# Patient Record
Sex: FEMALE | Race: WHITE | NOT HISPANIC OR LATINO
[De-identification: names, ages, dates, MRNs, and addresses within clinical notes are randomized per-mention and may not be internally consistent; named-entity substitution may affect disease eponyms.]

---

## 2017-12-12 ENCOUNTER — APPOINTMENT (OUTPATIENT)
Dept: PODIATRY | Facility: CLINIC | Age: 52
End: 2017-12-12

## 2017-12-12 ENCOUNTER — OUTPATIENT (OUTPATIENT)
Dept: OUTPATIENT SERVICES | Facility: HOSPITAL | Age: 52
LOS: 1 days | Discharge: HOME | End: 2017-12-12

## 2017-12-12 VITALS
BODY MASS INDEX: 33.33 KG/M2 | HEIGHT: 69 IN | HEART RATE: 82 BPM | DIASTOLIC BLOOD PRESSURE: 77 MMHG | SYSTOLIC BLOOD PRESSURE: 134 MMHG | WEIGHT: 225 LBS | TEMPERATURE: 96.4 F

## 2017-12-12 PROBLEM — Z00.00 ENCOUNTER FOR PREVENTIVE HEALTH EXAMINATION: Status: ACTIVE | Noted: 2017-12-12

## 2017-12-20 DIAGNOSIS — E11.40 TYPE 2 DIABETES MELLITUS WITH DIABETIC NEUROPATHY, UNSPECIFIED: ICD-10-CM

## 2017-12-20 DIAGNOSIS — B35.1 TINEA UNGUIUM: ICD-10-CM

## 2017-12-20 DIAGNOSIS — L85.3 XEROSIS CUTIS: ICD-10-CM

## 2018-02-20 ENCOUNTER — APPOINTMENT (OUTPATIENT)
Dept: PODIATRY | Facility: CLINIC | Age: 53
End: 2018-02-20

## 2018-04-24 ENCOUNTER — APPOINTMENT (OUTPATIENT)
Dept: PODIATRY | Facility: CLINIC | Age: 53
End: 2018-04-24

## 2018-05-16 ENCOUNTER — EMERGENCY (EMERGENCY)
Facility: HOSPITAL | Age: 53
LOS: 1 days | Discharge: LEFT BEFORE DISCHARGE | End: 2018-05-16

## 2018-05-16 VITALS
SYSTOLIC BLOOD PRESSURE: 135 MMHG | DIASTOLIC BLOOD PRESSURE: 63 MMHG | TEMPERATURE: 99 F | OXYGEN SATURATION: 100 % | RESPIRATION RATE: 18 BRPM | HEART RATE: 100 BPM

## 2018-05-16 DIAGNOSIS — Z04.8 ENCOUNTER FOR EXAMINATION AND OBSERVATION FOR OTHER SPECIFIED REASONS: ICD-10-CM

## 2018-05-16 NOTE — ED ADULT NURSE REASSESSMENT NOTE - NS ED NURSE REASSESS COMMENT FT1
Daughter updated myself that pt did not want to tell me the truth about how she has been feeling. Pt has been having hallucinations at home telling her to hurt herself and her daughter.  Charge nurse made aware. 1:1 initiated.

## 2018-05-16 NOTE — ED ADULT TRIAGE NOTE - CHIEF COMPLAINT QUOTE
Pt Blood sugar 29 after taking too much insulin. Pt ate candy and brought BS up to 82 with EMS. A&Ox3, GCS 15.    in Triage.

## 2018-07-08 ENCOUNTER — TRANSCRIPTION ENCOUNTER (OUTPATIENT)
Age: 53
End: 2018-07-08

## 2018-07-16 ENCOUNTER — TRANSCRIPTION ENCOUNTER (OUTPATIENT)
Age: 53
End: 2018-07-16

## 2018-07-19 ENCOUNTER — OUTPATIENT (OUTPATIENT)
Dept: OUTPATIENT SERVICES | Facility: HOSPITAL | Age: 53
LOS: 1 days | Discharge: HOME | End: 2018-07-19

## 2018-07-19 DIAGNOSIS — J18.9 PNEUMONIA, UNSPECIFIED ORGANISM: ICD-10-CM

## 2018-07-19 DIAGNOSIS — R05 COUGH: ICD-10-CM

## 2018-07-26 ENCOUNTER — APPOINTMENT (OUTPATIENT)
Dept: PODIATRY | Facility: CLINIC | Age: 53
End: 2018-07-26

## 2019-01-28 ENCOUNTER — APPOINTMENT (OUTPATIENT)
Dept: PODIATRY | Facility: CLINIC | Age: 54
End: 2019-01-28

## 2019-10-01 ENCOUNTER — OUTPATIENT (OUTPATIENT)
Dept: OUTPATIENT SERVICES | Facility: HOSPITAL | Age: 54
LOS: 1 days | End: 2019-10-01
Payer: MEDICAID

## 2019-10-09 ENCOUNTER — EMERGENCY (EMERGENCY)
Facility: HOSPITAL | Age: 54
LOS: 0 days | Discharge: LEFT BEFORE DISCHARGE | End: 2019-10-09
Attending: EMERGENCY MEDICINE | Admitting: EMERGENCY MEDICINE
Payer: MEDICAID

## 2019-10-09 VITALS — HEART RATE: 74 BPM | RESPIRATION RATE: 16 BRPM | DIASTOLIC BLOOD PRESSURE: 75 MMHG | SYSTOLIC BLOOD PRESSURE: 173 MMHG

## 2019-10-09 VITALS — HEIGHT: 69 IN | WEIGHT: 235.01 LBS

## 2019-10-09 DIAGNOSIS — R20.0 ANESTHESIA OF SKIN: ICD-10-CM

## 2019-10-09 DIAGNOSIS — R20.2 PARESTHESIA OF SKIN: ICD-10-CM

## 2019-10-09 LAB
ALBUMIN SERPL ELPH-MCNC: 4.2 G/DL — SIGNIFICANT CHANGE UP (ref 3.5–5.2)
ALP SERPL-CCNC: 54 U/L — SIGNIFICANT CHANGE UP (ref 30–115)
ALT FLD-CCNC: 23 U/L — SIGNIFICANT CHANGE UP (ref 0–41)
ANION GAP SERPL CALC-SCNC: 10 MMOL/L — SIGNIFICANT CHANGE UP (ref 7–14)
APTT BLD: 34.4 SEC — SIGNIFICANT CHANGE UP (ref 27–39.2)
AST SERPL-CCNC: 16 U/L — SIGNIFICANT CHANGE UP (ref 0–41)
BASOPHILS # BLD AUTO: 0.04 K/UL — SIGNIFICANT CHANGE UP (ref 0–0.2)
BASOPHILS NFR BLD AUTO: 0.7 % — SIGNIFICANT CHANGE UP (ref 0–1)
BILIRUB SERPL-MCNC: 0.3 MG/DL — SIGNIFICANT CHANGE UP (ref 0.2–1.2)
BUN SERPL-MCNC: 22 MG/DL — HIGH (ref 10–20)
CALCIUM SERPL-MCNC: 9.2 MG/DL — SIGNIFICANT CHANGE UP (ref 8.5–10.1)
CHLORIDE SERPL-SCNC: 101 MMOL/L — SIGNIFICANT CHANGE UP (ref 98–110)
CO2 SERPL-SCNC: 27 MMOL/L — SIGNIFICANT CHANGE UP (ref 17–32)
CREAT SERPL-MCNC: 0.6 MG/DL — LOW (ref 0.7–1.5)
EOSINOPHIL # BLD AUTO: 0.1 K/UL — SIGNIFICANT CHANGE UP (ref 0–0.7)
EOSINOPHIL NFR BLD AUTO: 1.7 % — SIGNIFICANT CHANGE UP (ref 0–8)
GLUCOSE SERPL-MCNC: 130 MG/DL — HIGH (ref 70–99)
HCT VFR BLD CALC: 40.4 % — SIGNIFICANT CHANGE UP (ref 37–47)
HGB BLD-MCNC: 12.7 G/DL — SIGNIFICANT CHANGE UP (ref 12–16)
IMM GRANULOCYTES NFR BLD AUTO: 0.2 % — SIGNIFICANT CHANGE UP (ref 0.1–0.3)
INR BLD: 0.9 RATIO — SIGNIFICANT CHANGE UP (ref 0.65–1.3)
LYMPHOCYTES # BLD AUTO: 1.87 K/UL — SIGNIFICANT CHANGE UP (ref 1.2–3.4)
LYMPHOCYTES # BLD AUTO: 32.4 % — SIGNIFICANT CHANGE UP (ref 20.5–51.1)
MCHC RBC-ENTMCNC: 29 PG — SIGNIFICANT CHANGE UP (ref 27–31)
MCHC RBC-ENTMCNC: 31.4 G/DL — LOW (ref 32–37)
MCV RBC AUTO: 92.2 FL — SIGNIFICANT CHANGE UP (ref 81–99)
MONOCYTES # BLD AUTO: 0.4 K/UL — SIGNIFICANT CHANGE UP (ref 0.1–0.6)
MONOCYTES NFR BLD AUTO: 6.9 % — SIGNIFICANT CHANGE UP (ref 1.7–9.3)
NEUTROPHILS # BLD AUTO: 3.36 K/UL — SIGNIFICANT CHANGE UP (ref 1.4–6.5)
NEUTROPHILS NFR BLD AUTO: 58.1 % — SIGNIFICANT CHANGE UP (ref 42.2–75.2)
NRBC # BLD: 0 /100 WBCS — SIGNIFICANT CHANGE UP (ref 0–0)
PLATELET # BLD AUTO: 273 K/UL — SIGNIFICANT CHANGE UP (ref 130–400)
POTASSIUM SERPL-MCNC: 4 MMOL/L — SIGNIFICANT CHANGE UP (ref 3.5–5)
POTASSIUM SERPL-SCNC: 4 MMOL/L — SIGNIFICANT CHANGE UP (ref 3.5–5)
PROT SERPL-MCNC: 7.4 G/DL — SIGNIFICANT CHANGE UP (ref 6–8)
PROTHROM AB SERPL-ACNC: 10.4 SEC — SIGNIFICANT CHANGE UP (ref 9.95–12.87)
RBC # BLD: 4.38 M/UL — SIGNIFICANT CHANGE UP (ref 4.2–5.4)
RBC # FLD: 13.6 % — SIGNIFICANT CHANGE UP (ref 11.5–14.5)
SODIUM SERPL-SCNC: 138 MMOL/L — SIGNIFICANT CHANGE UP (ref 135–146)
WBC # BLD: 5.78 K/UL — SIGNIFICANT CHANGE UP (ref 4.8–10.8)
WBC # FLD AUTO: 5.78 K/UL — SIGNIFICANT CHANGE UP (ref 4.8–10.8)

## 2019-10-09 PROCEDURE — 99284 EMERGENCY DEPT VISIT MOD MDM: CPT

## 2019-10-09 PROCEDURE — 70450 CT HEAD/BRAIN W/O DYE: CPT | Mod: 26

## 2019-10-09 PROCEDURE — 93010 ELECTROCARDIOGRAM REPORT: CPT

## 2019-10-09 RX ORDER — ASPIRIN/CALCIUM CARB/MAGNESIUM 324 MG
325 TABLET ORAL ONCE
Refills: 0 | Status: COMPLETED | OUTPATIENT
Start: 2019-10-09 | End: 2019-10-09

## 2019-10-09 NOTE — ED ADULT TRIAGE NOTE - CHIEF COMPLAINT QUOTE
right arm numbness and right sided facial numbness started at 9AM right arm numbness and right sided facial numbness started at 9AM, pt states she woke up around 7 Am

## 2019-10-09 NOTE — ED PROVIDER NOTE - ATTENDING CONTRIBUTION TO CARE
53 yo F presented to ED for paresthesias of the R side of her body. Concern for CVA vs neuropathy.     Normal neuro exam- equal strength in upper and lower extremities. Sensation intact. Normal gait.     Will get labs, CT scan and reassess.

## 2019-10-09 NOTE — ED PROVIDER NOTE - OBJECTIVE STATEMENT
55yo female with PMHx DM presents c/o R arm and face tingling first noticed at 0900. Patient states she awoke at 0700 and felt ok and then went back to sleep awakening at 0900. She described tingling starting R upper back radiating down to R hand described as if "R arm fell asleep and needing to "shake it out." Denies heaviness or weakness. She states similar sensation was also felt in R foot and R face. R foot resolving. She denies HA, visual changes, or speech difficulty.

## 2019-10-09 NOTE — ED PROVIDER NOTE - PROGRESS NOTE DETAILS
Discussed with neuro, recommended admission as patient is still symptomatic in R hand, however patient does not want to stay. States cannot do a enclosed MRI, prefers open which is only available outpatient. Refuses ASA in ED as well as she states she has retinal hemorrhages in past from NSAIDs. The patient wishes to leave against medical advice.  I have discussed the risks, benefits and alternatives (including the possibility of worsening of disease, pain, permanent disability, and/or death) with the patient and his/her family (if available).  The patient voices understanding of these risks, benefits, and alternatives and still wishes to sign out against medical advice.  The patient is awake, alert, oriented  x 3 and has demonstrated capacity to refuse/direct care.  I have advised the patient that they can and should return immediately should they develop any worse/different/additional symptoms, or if they change their mind and want to continue their care.

## 2019-10-09 NOTE — CONSULT NOTE ADULT - ASSESSMENT
55 yo female with pmh of diabetes, chronic lacunar infarct on CTH presents with acute onset of right sided numbness including face. On exam she has diminished sensation to touch on the right side.      Plan:  MRI brain NC  MRI cerv NC  Carotid duplex  Echo  HA1C, Lipid profile          Neuroattending note will follow

## 2019-10-09 NOTE — ED PROVIDER NOTE - PATIENT PORTAL LINK FT
You can access the FollowMyHealth Patient Portal offered by [facility] by registering at the following website: [email or web address]. By joining [email or web address]’s FollowMyHealth portal, you will also be able to view your health information using other applications (apps) compatible with our system.

## 2019-10-09 NOTE — ED PROVIDER NOTE - CARE PROVIDER_API CALL
Som Castellano)  EEGEpilepsy; Neurology  [address] [place] [address], [address]  [place] [address]  Phone: [phone]  Fax: [phone]  Follow Up Time:

## 2019-10-09 NOTE — ED PROVIDER NOTE - PHYSICAL EXAMINATION
CONST: Well appearing in NAD  EYES: PERRL, EOMI, Sclera and conjunctiva clear.   NECK: Non-tender, no meningeal signs  CARD: Normal S1 S2; Normal rate and rhythm  RESP: Equal BS B/L, No wheezes, rhonchi or rales. No distress  GI: Soft, non-tender, non-distended.  MS: Normal ROM in all extremities. No midline spinal tenderness.  SKIN: Warm, dry, no acute rashes. Good turgor  NEURO: A&Ox3, No focal deficits. Strength 5/5 throughout with no sensory deficits. Steady gait. Romberg/drift negative. Stacy intact.

## 2019-10-09 NOTE — ED ADULT NURSE NOTE - OBJECTIVE STATEMENT
PT presents to ER with new onset of right sided upper and lower extremity numbness starting at 9am today when she woke up, pt a&ox4, speaking coherently, NIH scale 0 at this time. IV inserted. Safety maintained and hourly rounding performed. Will continue with plan of care.

## 2019-10-09 NOTE — ED PROVIDER NOTE - NS ED ROS FT
CONST: No fever, chills or bodyaches  EYES: No pain, redness, drainage or visual changes.  ENT:   CARD: No chest pain, palpitations  RESP: No SOB, cough  GI: No abdominal pain, N/V/D  MS: No joint pain, back pain or extremity pain/injury  SKIN: No rashes  NEURO: see HPI

## 2019-10-09 NOTE — ED ADULT NURSE NOTE - CHIEF COMPLAINT QUOTE
right arm numbness and right sided facial numbness started at 9AM, pt states she woke up around 7 Am

## 2019-10-09 NOTE — CONSULT NOTE ADULT - SUBJECTIVE AND OBJECTIVE BOX
Neurology Consult    Patient is a 54y old  Female who presents with a chief complaint of right sided numbness that she woke up with this morning. She describes her numbness as pins and needles sensation, no associated pain or weakness. She also notes that numbness goes from her right sided neck to her face. She denies any headache, dizziness, weakness or ataxia. NIHSS is 1. CTH shows small lacunar infarct in the left capsulothalamic region.  Patient notes history of back trauma in the past but she did not have MRI. She also notes reaction to IV contrast.          PAST MEDICAL & SURGICAL HISTORY:  Diabetes  :Left eye retinal detachment (diab complication)      FAMILY HISTORY:      Social History: (-) x 3    Allergies    No Known Allergies    Intolerances        MEDICATIONS  (STANDING):  aspirin 325 milliGRAM(s) Oral Once    MEDICATIONS  (PRN):      Review of systems:    Constitutional: No fever, weight loss or fatigue    Eyes: No eye pain or discharge  ENMT:  No difficulty hearing; No sinus or throat pain  Neck: No pain or stiffness  Respiratory: No cough, wheezing, chills or hemoptysis  Cardiovascular: No chest pain, palpitations, shortness of breath, dyspnea on exertion  Gastrointestinal: No abdominal pain, nausea, vomiting or hematemesis; No diarrhea or constipation.   Genitourinary: No dysuria, frequency, hematuria or incontinence  Neurological: As per HPI  Skin: No rashes or lesions   Endocrine: No heat or cold intolerance; No hair loss  Musculoskeletal: No joint pain or swelling  Psychiatric: No depression, anxiety, mood swings  Heme/Lymph: No easy bruising or bleeding gums    Vital Signs Last 24 Hrs  T(C): 36.1 (09 Oct 2019 11:07), Max: 36.1 (09 Oct 2019 10:46)  T(F): 97 (09 Oct 2019 11:07), Max: 97 (09 Oct 2019 11:07)  HR: 74 (09 Oct 2019 14:31) (74 - 85)  BP: 173/75 (09 Oct 2019 14:31) (173/75 - 189/84)  BP(mean): --  RR: 16 (09 Oct 2019 14:31) (16 - 18)  SpO2: 100% (09 Oct 2019 11:07) (100% - 100%)    Neurologic Examination:  General:  Appearance is consistent with chronologic age.  No abnormal facies.   General: The patient is oriented to person, place, time and date.  Recent and remote memory intact.  Fund of knowledge is intact and normal.  Language with normal repetition, comprehension and naming.  Nondysarthric.    Cranial nerves: intact VA, VFF.  EOMI w/o nystagmus, skew or reported double vision.  PERRL.  No ptosis/weakness of eyelid closure.  Facial sensation is normal with normal bite.  No facial asymmetry.  Hearing grossly intact b/l.  Palate elevates midline.  Tongue midline.  Motor examination:   Normal tone, bulk and range of motion.  No tenderness, twitching, tremors or involuntary movements.  Formal Muscle Strength Testing: (MRC grade R/L) 5/5 UE; 5/5 LE.  No observable drift.  Reflexes:   2+ b/l pectoralis, biceps, triceps, brachioradialis, patella and Achilles.  Plantar response downgoing b/l.  Jaw jerk, Hoa, clonus absent.  Sensory examination:   decreased  to light touch on the right, intact to pinprick, pain, temperature and proprioception and vibration in all extremities.  Cerebellum:   FTN/HKS intact with normal YANET in all limbs.  No dysmetria or dysdiadokinesia.     Labs:   CBC Full  -  ( 09 Oct 2019 11:05 )  WBC Count : 5.78 K/uL  RBC Count : 4.38 M/uL  Hemoglobin : 12.7 g/dL  Hematocrit : 40.4 %  Platelet Count - Automated : 273 K/uL  Mean Cell Volume : 92.2 fL  Mean Cell Hemoglobin : 29.0 pg  Mean Cell Hemoglobin Concentration : 31.4 g/dL  Auto Neutrophil # : 3.36 K/uL  Auto Lymphocyte # : 1.87 K/uL  Auto Monocyte # : 0.40 K/uL  Auto Eosinophil # : 0.10 K/uL  Auto Basophil # : 0.04 K/uL  Auto Neutrophil % : 58.1 %  Auto Lymphocyte % : 32.4 %  Auto Monocyte % : 6.9 %  Auto Eosinophil % : 1.7 %  Auto Basophil % : 0.7 %    10-09    138  |  101  |  22<H>  ----------------------------<  130<H>  4.0   |  27  |  0.6<L>    Ca    9.2      09 Oct 2019 11:05    TPro  7.4  /  Alb  4.2  /  TBili  0.3  /  DBili  x   /  AST  16  /  ALT  23  /  AlkPhos  54  10-09    LIVER FUNCTIONS - ( 09 Oct 2019 11:05 )  Alb: 4.2 g/dL / Pro: 7.4 g/dL / ALK PHOS: 54 U/L / ALT: 23 U/L / AST: 16 U/L / GGT: x           PT/INR - ( 09 Oct 2019 11:05 )   PT: 10.40 sec;   INR: 0.90 ratio         PTT - ( 09 Oct 2019 11:05 )  PTT:34.4 sec            Assessment:  This is a 54y Female with h/o     Plan:   -   10-09-19 @ 15:15

## 2019-10-09 NOTE — ED PROVIDER NOTE - CLINICAL SUMMARY MEDICAL DECISION MAKING FREE TEXT BOX
53 yo F presented to ED for paresthesias on the R side of her body. Patient was found to have a lacunar infarct of unknown origin. Seen by neurology. Due to patient remaining symptomatic we wanted to admit her. However, she wants to leave. She understands the risks of leaving AMA including ICH and larger hemorrhage which could cause death. She understands and still wants to leave.   Patient signed out AMA

## 2019-10-10 DIAGNOSIS — Z71.89 OTHER SPECIFIED COUNSELING: ICD-10-CM

## 2019-10-14 DIAGNOSIS — Z76.89 PERSONS ENCOUNTERING HEALTH SERVICES IN OTHER SPECIFIED CIRCUMSTANCES: ICD-10-CM

## 2019-10-16 PROBLEM — E11.9 TYPE 2 DIABETES MELLITUS WITHOUT COMPLICATIONS: Chronic | Status: ACTIVE | Noted: 2019-10-09

## 2019-10-18 ENCOUNTER — APPOINTMENT (OUTPATIENT)
Dept: NEUROLOGY | Facility: CLINIC | Age: 54
End: 2019-10-18

## 2019-11-01 PROCEDURE — G9005: CPT

## 2019-11-25 ENCOUNTER — APPOINTMENT (OUTPATIENT)
Dept: PODIATRY | Facility: CLINIC | Age: 54
End: 2019-11-25

## 2019-12-19 ENCOUNTER — OUTPATIENT (OUTPATIENT)
Dept: OUTPATIENT SERVICES | Facility: HOSPITAL | Age: 54
LOS: 1 days | Discharge: HOME | End: 2019-12-19

## 2019-12-19 ENCOUNTER — APPOINTMENT (OUTPATIENT)
Dept: PODIATRY | Facility: CLINIC | Age: 54
End: 2019-12-19
Payer: MEDICAID

## 2019-12-19 DIAGNOSIS — E11.40 TYPE 2 DIABETES MELLITUS WITH DIABETIC NEUROPATHY, UNSPECIFIED: ICD-10-CM

## 2019-12-19 DIAGNOSIS — E11.42 TYPE 2 DIABETES MELLITUS WITH DIABETIC POLYNEUROPATHY: ICD-10-CM

## 2019-12-19 DIAGNOSIS — L85.3 XEROSIS CUTIS: ICD-10-CM

## 2019-12-19 DIAGNOSIS — B35.1 TINEA UNGUIUM: ICD-10-CM

## 2019-12-19 PROCEDURE — 11721 DEBRIDE NAIL 6 OR MORE: CPT

## 2019-12-19 PROCEDURE — 99213 OFFICE O/P EST LOW 20 MIN: CPT | Mod: 25

## 2019-12-19 RX ORDER — CICLOPIROX OLAMINE 7.7 MG/G
0.77 CREAM TOPICAL TWICE DAILY
Qty: 1 | Refills: 3 | Status: ACTIVE | COMMUNITY
Start: 2017-12-12 | End: 1900-01-01

## 2019-12-27 PROBLEM — E11.42 CONTROLLED DIABETES MELLITUS WITH DIABETIC POLYNEUROPATHY: Status: ACTIVE | Noted: 2019-12-27

## 2019-12-27 PROBLEM — B35.1 DERMATOPHYTOSIS OF NAIL: Status: ACTIVE | Noted: 2017-12-12

## 2019-12-27 PROBLEM — L85.3 DRY SKIN: Status: ACTIVE | Noted: 2017-12-12

## 2019-12-27 PROBLEM — E11.40 DIABETES MELLITUS WITH NEUROPATHY: Status: ACTIVE | Noted: 2017-12-12

## 2019-12-27 NOTE — PHYSICAL EXAM
[Full Pulse] : the pedal pulses are present [Left Foot Was Examined] : The left foot was examined [Right Foot Was Examined] : The right foot was examined [Normal Appearance] : normal in appearance [Normal in Appearance] : normal in appearance [Vibration Dec.] : diminished vibratory sensation at the level of the toes [1+] : 1+ in the dorsalis pedis [#2 Diminished] : number 2 was diminished [#1 Diminished] : number 1 was diminished [#3 Diminished] : number 3 was diminished [Tenderness] : not tender [Erythema] : not erythematous [Full ROM] : with limited range of motion [Swelling] : not swollen [Position Sense Dec.] : normal position sense at the level of the toes [Diminished Throughout Right Foot] : normal tactile sensation with monofilament testing throughout right foot [Diminished Throughout Left Foot] : normal tactile sensation with monofilament testing throughout left foot [FreeTextEntry1] : Thick elongated nails with subungual debris x10

## 2019-12-27 NOTE — ASSESSMENT
[FreeTextEntry1] : Onychomycosis nails x10 \par Debrided nails x 10; carefully removed subungual  debris\par Rx Ciclopirox cream to nails BID\par Diabetic foot patient education\par RTO in 3 months or prn\par

## 2019-12-27 NOTE — HISTORY OF PRESENT ILLNESS
[FreeTextEntry1] : Patient presents with DM2, hx of gestational diabetes and polyneuropathy. Complains of thickened, dystrophic, elongated toe nails for many years and dry skin. Denies any recent trauma to feet, open wounds, fever, chills or malaise.

## 2022-11-03 NOTE — ED ADULT NURSE NOTE - NS ED NURSE LEVEL OF CONSCIOUSNESS ORIENTATION
[FreeTextEntry1] : The visit was provided via telehealth using real-time 2-way audio visual technology. The patient, Apple Parekh, was located at home, in [place], [place] at the time of the visit. The physician, Jonny Delarosa, and genetic counselor, Naomie Carver, were located at their respective medical offices in [place], [place] and [place], [place] at the time of the visit. Genetic counseling , Thor Smith, also participated in this session. Verbal consent for telehealth services was given on 10/27/22 by the patient, Apple Parekh.\par \par REASON FOR CONSULT\par Apple Parekh is a 60-year-old female referred by Dr. Jameson Alston for cancer genetic counseling and a discussion regarding her genetic testing results related to hereditary cancer predisposition. Ms. Parekh was seen via telehealth on 2022 at which time medical and family history was ascertained and a pedigree constructed. \par \par RELEVANT MEDICAL HISTORY\par Ms. Parekh is a healthy individual with no reported history of cancer. She pursued genetic testing on 2022 using [facility] test, ordered by her gynecologist Dr. Ileana Scott, due to her family history of cancer. 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 \par \par HORMONAL HISTORY:\par Obstetrical History: , twins conceived via egg donor\par Age at Menarche: 12\par Menopausal Status: Post-Menopausal with LMP at age 50 \par Age at First Live Birth: 46\par Oral Contraceptive Use: Yes, several decades\par Hormone Replacement Therapy: No\par \par CANCER SCREENING HISTORY:  \par Breast: \par •	Mammography: 22- wnl, BIRADS 2\par •	Sonography: 22- wnl, BIRADS 2\par •	MRI: 22- recommend biopsy for right breast NME\par •	Biopsies: 10/4/220 [facility], preiovus history of calcium deposit after pregnancy\par GYN:\par •	Pelvic Examination: Annual- reported no history of gynecological concerns\par •	Sonography: Patient reported recent TVUS was normal\par •	CA-125: 10/24/22- 5 U/mL\par Colon:\par •	Colonoscopy: 2019- reported a few polyps identified on previous colonoscopies (under 10, pathology unknown), repeat every 5 years\par Skin:  \par •	FBSE: Yes, annual\par •	Lesions biopsied/removed: Yes- basal cell x 3 (forehead, face, back of head)\par \par SOCIAL HISTORY:\par •	Tobacco-product use: Yes, former- socially over 10 years ago\par •	Environmental exposures: No\par \par FAMILY HISTORY:\par Maternal and paternal ancestry was reported as Spanish and O[place]i. Ashkenazi Mosque ancestry was denied. A detailed family history of cancer was ascertained, see below and scanned chart for pedigree. \par \par According to Ms. Parekh no one else in the family has had germline testing for cancer susceptibility. Consanguinity was denied. \par \par RESULTS INTERPRETATION AND ASSESSMENT:\par We informed Ms. 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 \par  \par As part of our discussion, we reviewed the cancer risks associated with the BRIP1 mutation:\par \par OVARIAN CANCER RISK:\par Lifetime risk to develop ovarian cancer is estimated to be approximately 5-15% compared to the general population risk of 1.2%. \par \par BREAST CANCER RISK: \par Currently, it is unclear whether pathogenic mutations in the BRIP1 gene are associated with an increased breast cancer risk. Limited information suggests that there may be a potential increased risk for female breast cancer, including triple negative disease.\par \par In addition, we discussed with Ms. Parekh that she tested positive for a single pathogenic mutation in the MUTYH gene (c.536A>G; p.[id number]). 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 Individuals with a single pathogenic mutation in MUTYH may have a slightly increased risk to develop colorectal cancer compared to the general population risk. Some studies have suggested an increased risk to develop breast cancer as well. However, these studies have not been confirmed in large, pan-ethnic studies, and therefore no changes in screening are recommended at this time. Individuals with bi-allelic (two copies) MUTYH mutations are known to have autosomal recessive MUTYH-associated polyposis (MAP) characterized by adult-onset multiple colorectal adenomas and increased risk for colorectal cancer. Ms. Parekh DOES NOT have MUTYH-associated polyposis as carrying a single MUTYH mutation is not sufficient to cause MAP. \par \par PATIENT MANAGEMENT IMPLICATIONS:\par Given Ms. Parekh’s personal and current reported family history of cancer, and her BRIP1 positive genetic test results, the following screening guidelines and risk-reducing recommendations were discussed:\par \par OVARIAN: \par •	We discussed ovarian cancer screening with transvaginal ultrasound and/or serum  testing has not been shown to reduce ovarian cancer mortality in women with ovarian cancer susceptibility mutations such as BRIP1. Given this, NCCN guidelines recommend discussion of risk-reducing salpingo-oophorectomy (RRSO) for ovarian cancer risk-reduction between the ages of 45-50.\par •	Ms. Parekh has already met with gynecologic-oncologist Dr. Meggan Bravo and is planning on pursuing risk reducing salpingo-oophorectomy early next year. \par \par BREAST:\par •	Although it is currently unclear as to whether BRIP1 is associated with an increased risk of breast cancer, we did discuss consideration of increased breast screening via annual mammogram and annual breast MRI given Ms. Parekh’s personal history of atypical lobular hyperplasia and family history of breast cancer including the pros and cons and age to consider discontinuing this protocol.\par •	We also briefly discussed the option of chemoprevention and recommended that Ms. Parekh discuss this option further with Dr. Petty if she desires.\par •	We also briefly discussed the option of risk-reducing mastectomy.\par •	After discussing options, Ms. Parekh stated she will continue high-risk breast cancer screening. \par \par COLON:\par •	Ms. Parekh stated she is already undergoing colonoscopy every 5 years due to a personal history of colon polyps and family history of colon cancer. We recommended continuation of this protocol at the discretion of her treating gastroenterologist. \par \par OTHER:\par •	In the absence of other indications, Ms. Parekh should practice age-appropriate cancer screening of other organ systems as recommended for the general population.\par \par REPRODUCTIVE AND FAMILY MEMBER IMPLICATIONS:\par Both the BRIP1 and MUTYH mutations are inherited in an autosomal dominant pattern. We recommend the patient’s first-degree relatives, specifically her siblings, pursue genetic counseling and genetic testing as there is a 50% chance they also have the same mutation. Additionally, the risk of passing on this mutation to a future generation is 50% although of note, Ms. Parekh stated her sons were conceived via egg donor and would therefore not be at risk to have inherited this BRIP1 or MUTYH mutation from her.\par \par Ms. Parekh was also informed that individuals with biallelic mutations in the BRIP1 gene have been reported to have Fanconi-Anemia (FA), Complementation Group J. FA is an autosomal recessive condition characterized by physical abnormalities (i.e. short statures, skeletal malformations), bone marrow failure and increased risk for acute myeloid leukemia and other malignancies. For those of reproductive age, we recommend the partner of an individual who is a BRIP1 carrier also have BRIP1 genetic testing to assess the risk of having a child affected with this condition if it would inform reproductive decision making. If both individuals carry a single BRIP1 mutation, there is a 25% chance for each pregnancy to be affected with FA.\par \par Additionally, while being a MUTYH carrier has minimal impact on screening recommendations at this point in time, individuals may want to determine their MUTYH status for reproductive purposes to evaluate their risk to have a child with MUTYH-associated polyposis (MAP). If both the individual and their reproductive partner are found to be MUTYH carriers there is a 25% chance for each pregnancy to be affected with MAP. If an individual of reproductive age is found to be a MUTYH carrier, we recommend their reproductive partner be tested for MUTYH to assess the risk of having a pregnancy affected with MAP.\par \par Ms. Parekh was made aware that if any at-risk relatives wanted to pursue genetic testing any time in the future, we would be happy to see them and coordinate testing. \par \par PLAN:\par 1.	See above note for recommended management.\par 2.	We encourage sharing these results with family members.  They have a risk to have inherited the same mutation.  Other family may benefit from genetic testing and should contact a certified genetic counselor specializing in cancer.  Due to HIPAA and [place] laws, we are unable to directly contact other family at risk, but we are available should family members wish to reach out to us.\par 3.	Patient informed consult note(s) will be available through their [facility] patient portal  \par 4.	Ms. Parekh was encouraged to contact us every 2-3 years to discuss relevant advances in cancer genetics, or sooner if there are any changes in her personal or family history of cancer.\par \par For any additional questions please call Cancer Genetics at [phone]. \par \par \par Naomie Carver MS, [facility]\par Genetic Counselor, Cancer Genetics\par \par \par Attending Attestation:\par \par I have reviewed and edited the genetic counselor's note and I agree with the assessment and plan as documented. I spent approximately 45 minutes in total time of which approximately 30 minutes was face-to-face (via 2-way audiovisual telemedicine connection) with Ms. Parekh reviewing her relevant personal and family history, the genetic testing results, our risk-assessment, and options for future cancer risk-reduction for the patient and her relevant family members. Over half this time was spent in counseling and coordination of care.\par \par Jonny Delarosa MD\par Chief, Cancer Genetics\par [facility] [place]\par \par \par \par \par \par  Oriented - self; Oriented - place; Oriented - time